# Patient Record
(demographics unavailable — no encounter records)

---

## 2025-05-27 NOTE — DISCUSSION/SUMMARY
[FreeTextEntry1] : 49-year-old para 0 annual GYN, abnormal uterine bleeding,  Pap HPV Pelvic sono-simple left ovarian cyst, paraovarian right cyst, small fibroid, free fluid, and a 3 mm Mammogram breast sonogram in 6 months Hormonal workup Endometrial evaluation if continues to have irregular bleeding to rule out hyperplasia or malignancy discussed this patient CEA

## 2025-05-27 NOTE — HISTORY OF PRESENT ILLNESS
[Patient reported mammogram was normal] : Patient reported mammogram was normal [Patient reported PAP Smear was normal] : Patient reported PAP Smear was normal [N] : Patient denies prior pregnancies [FreeTextEntry1] : 48 yo P-3 LMP-  7-2024  IS HERE FOR ANNUAL EXAM , patient reports spotting for 2 days 2 weeks ago.  No cramping.  She has no high flashes no night sweats She is sexually active Breast MRI March 2025-she will follow-up with mammogram and sonogram in 6-month strong family hx breast Ca  mom age 57 sister age 35 - (they BRCA neg) [TextBox_4] : GYNHX No history of fibroids, cysts, or STDs [Mammogramdate] : 8-2024 [Papeardate] : 9-2023 [LMPDate] : 7-2024

## 2025-05-27 NOTE — HISTORY OF PRESENT ILLNESS
[Patient reported mammogram was normal] : Patient reported mammogram was normal [Patient reported PAP Smear was normal] : Patient reported PAP Smear was normal [N] : Patient denies prior pregnancies [FreeTextEntry1] : 50 yo P-3 LMP-  7-2024  IS HERE FOR ANNUAL EXAM , patient reports spotting for 2 days 2 weeks ago.  No cramping.  She has no high flashes no night sweats She is sexually active Breast MRI March 2025-she will follow-up with mammogram and sonogram in 6-month strong family hx breast Ca  mom age 57 sister age 35 - (they BRCA neg) [TextBox_4] : GYNHX No history of fibroids, cysts, or STDs [Mammogramdate] : 8-2024 [Papeardate] : 9-2023 [LMPDate] : 7-2024

## 2025-05-27 NOTE — PHYSICAL EXAM
[Chaperone Declined] : Chaperone offered however refused by patient, [Appropriately responsive] : appropriately responsive [Alert] : alert [No Acute Distress] : no acute distress [No Lymphadenopathy] : no lymphadenopathy [Soft] : soft [Non-tender] : non-tender [Non-distended] : non-distended [No HSM] : No HSM [No Lesions] : no lesions [No Mass] : no mass [Oriented x3] : oriented x3 [Examination Of The Breasts] : a normal appearance [No Discharge] : no discharge [No Masses] : no breast masses were palpable [Labia Majora] : normal [Labia Minora] : normal [No Bleeding] : There was no active vaginal bleeding [Old Blood] : old blood was present in the vagina [Normal] : normal [Retroversion] : retroverted [Uterine Adnexae] : normal

## 2025-05-27 NOTE — PROCEDURE
[Cervical Pap Smear] : cervical Pap smear [Liquid Base] : liquid base [Tolerated Well] : the patient tolerated the procedure well [No Complications] : there were no complications [Abnormal Uterine Bleeding] : abnormal uterine bleeding [Transvaginal Ultrasound] : transvaginal ultrasound [FreeTextEntry4] : Normal endometrium 3 mm left ovarian cyst simple 33 x 18 x 26 right paraovarian 12 mm, free fluid in cul-de-sac,  small myomas largest 27 mm

## 2025-07-25 NOTE — REASON FOR VISIT
[Follow-Up: _____] : a [unfilled] follow-up visit [FreeTextEntry1] : fibrocystic breast changes; family history of breast cancer; high risk screening; imaging review.

## 2025-07-25 NOTE — PHYSICAL EXAM
[Normocephalic] : normocephalic [Atraumatic] : atraumatic [No Supraclavicular Adenopathy] : no supraclavicular adenopathy [No dominant masses] : no dominant masses in right breast  [No dominant masses] : no dominant masses left breast [No Nipple Discharge] : no left nipple discharge [No Rashes] : no rashes [No Ulceration] : no ulceration [Breast Nipple Inversion] : nipples not inverted [Breast Nipple Retraction] : nipples not retracted [de-identified] : B/L dense breast parenchyma, no palpable abnormalities.  [de-identified] : No axillary lymphadenopathy appreciated. [de-identified] : No axillary lymphadenopathy appreciated.

## 2025-07-25 NOTE — DATA REVIEWED
[FreeTextEntry1] : B/L Breast MRI - 03/22/2025: Findings:  Amount of fibroglandular tissue: Extreme fibroglandular tissue  Background parenchymal enhancement: Marked, Symmetric  RIGHT BREAST: There are scattered nonspecific foci of enhancement likely on basis of normal fibroglandular tissue. However, short interval follow-up recommended. There is no axillary lymph adenopathy.  LEFT BREAST: There is scattered nonspecific foci of enhancement likely on the basis of normal fibroglandular tissue. However, short interval follow-up recommended. There is no axillary lymph adenopathy.  The imaged portions of the chest and abdomen are unremarkable.  Impression:  Bilateral scattered nonspecific foci of enhancement likely on the basis of normal fibroglandular tissue. Short interval follow-up amended.  Recommendation: Follow-up breast MRI in 6 months.  BI-RADS category 3: Probably Benign

## 2025-07-25 NOTE — PAST MEDICAL HISTORY
[Menstruating] : The patient is menstruating [Menarche Age ____] : age at menarche was [unfilled] [Regular Cycle Intervals] : have been regular [Total Preg ___] : G[unfilled] [FreeTextEntry6] : none [FreeTextEntry5] : none [FreeTextEntry7] : none [FreeTextEntry8] : none

## 2025-07-25 NOTE — HISTORY OF PRESENT ILLNESS
[FreeTextEntry1] : Patient with history of Left breast benign core biopsy 2008. Right breast fibroadenoma on MR guided core biopsy 6/6/14; Medial, 6 mm.   Left breast fibroadenoma on MR guided core biopsy 6/20/14; 10 mm.    Her family history is significant with her maternal grandmother with breast cancer at 57; her mother with breast cancer at 47; and her sister with breast cancer at 35.  No genetic testing in family.  Her Keysha Model risk assessment is: 2.4 % in five years  38.9 % in her lifetime.   Right benign breast tissue with proliferative type fibrocystic changes on MR core biopsy 11/20/18; UOQ 6 mm (cork). Left benign breast tissue with proliferative type fibrocystic changes on MR core biopsy 11/20/18; UIQ 6 mm (cork).    WALTER WEBSTER is a 50-year-old female patient who presents today in follow up for fibrocystic breast changes; family history of breast cancer; high risk screening. Since her last visit, she has no new breast related complaints.   Most recent imaging: B/L Breast MRI - 03/22/2025: -Amount of fibroglandular tissue: Extreme fibroglandular tissue RIGHT BREAST: -There are scattered nonspecific foci of enhancement likely on basis of normal fibroglandular tissue. However, short interval follow-up recommended. -There is no axillary lymph adenopathy. LEFT BREAST: -There is scattered nonspecific foci of enhancement likely on the basis of normal fibroglandular tissue. However, short interval follow-up recommended. -There is no axillary lymph adenopathy. The imaged portions of the chest and abdomen are unremarkable. Recommendation: Follow-up breast MRI in 6 months. BI-RADS category 3: Probably Benign  She presents today for evaluation and imaging review.

## 2025-07-25 NOTE — ASSESSMENT
[FreeTextEntry1] : WALTER is a ronald 50-year-old patient who presented today in follow up for a history of fibrocystic breast changes; family history of breast cancer; high risk screening.   She has been doing well with no new breast related complaints.   Most recent imaging: B/L Breast MRI - 03/22/2025: -Amount of fibroglandular tissue: Extreme fibroglandular tissue RIGHT BREAST: -There are scattered nonspecific foci of enhancement likely on basis of normal fibroglandular tissue. However, short interval follow-up recommended. -There is no axillary lymph adenopathy. LEFT BREAST: -There is scattered nonspecific foci of enhancement likely on the basis of normal fibroglandular tissue. However, short interval follow-up recommended. -There is no axillary lymph adenopathy. The imaged portions of the chest and abdomen are unremarkable. Recommendation: Follow-up breast MRI in 6 months. BI-RADS category 3: Probably Benign, as detailed above.   Physical exam was unrevealing today.  Plan: 1. B/L Screening Mammo & Sono - ordered for August 2025. 2. B/L Breast MRI for short-term follow-up - ordered for September 2025. 2. Follow-up and clinical breast exam will be determined based on above results.    I spent a total of 20 minutes of face-to-face time with this patient, greater than 50% of which was spent in counseling and/or coordination of care. All of her questions were appropriately answered. She knows to call with any concerns.